# Patient Record
Sex: MALE | Race: OTHER | URBAN - METROPOLITAN AREA
[De-identification: names, ages, dates, MRNs, and addresses within clinical notes are randomized per-mention and may not be internally consistent; named-entity substitution may affect disease eponyms.]

---

## 2017-08-23 ENCOUNTER — INPATIENT (INPATIENT)
Facility: HOSPITAL | Age: 47
LOS: 1 days | Discharge: HOME | DRG: 358 | End: 2017-08-25
Attending: SURGERY | Admitting: SURGERY
Payer: COMMERCIAL

## 2017-08-23 ENCOUNTER — TRANSCRIPTION ENCOUNTER (OUTPATIENT)
Age: 47
End: 2017-08-23

## 2017-08-23 VITALS
TEMPERATURE: 98 F | HEART RATE: 77 BPM | RESPIRATION RATE: 20 BRPM | DIASTOLIC BLOOD PRESSURE: 82 MMHG | HEIGHT: 66 IN | OXYGEN SATURATION: 100 % | WEIGHT: 265 LBS | SYSTOLIC BLOOD PRESSURE: 123 MMHG

## 2017-08-23 DIAGNOSIS — K56.69 OTHER INTESTINAL OBSTRUCTION: ICD-10-CM

## 2017-08-23 LAB
ALBUMIN SERPL ELPH-MCNC: 5 G/DL — SIGNIFICANT CHANGE UP (ref 3.3–5.2)
ALP SERPL-CCNC: 72 U/L — SIGNIFICANT CHANGE UP (ref 40–120)
ALT FLD-CCNC: 42 U/L — HIGH
ANION GAP SERPL CALC-SCNC: 14 MMOL/L — SIGNIFICANT CHANGE UP (ref 5–17)
APPEARANCE UR: ABNORMAL
AST SERPL-CCNC: 26 U/L — SIGNIFICANT CHANGE UP
BACTERIA # UR AUTO: ABNORMAL
BASOPHILS # BLD AUTO: 0 K/UL — SIGNIFICANT CHANGE UP (ref 0–0.2)
BASOPHILS NFR BLD AUTO: 0.1 % — SIGNIFICANT CHANGE UP (ref 0–2)
BILIRUB SERPL-MCNC: 0.5 MG/DL — SIGNIFICANT CHANGE UP (ref 0.4–2)
BILIRUB UR-MCNC: NEGATIVE — SIGNIFICANT CHANGE UP
BUN SERPL-MCNC: 22 MG/DL — HIGH (ref 8–20)
CALCIUM SERPL-MCNC: 9.6 MG/DL — SIGNIFICANT CHANGE UP (ref 8.6–10.2)
CHLORIDE SERPL-SCNC: 101 MMOL/L — SIGNIFICANT CHANGE UP (ref 98–107)
CO2 SERPL-SCNC: 25 MMOL/L — SIGNIFICANT CHANGE UP (ref 22–29)
COLOR SPEC: YELLOW — SIGNIFICANT CHANGE UP
COMMENT - URINE: SIGNIFICANT CHANGE UP
CREAT SERPL-MCNC: 1 MG/DL — SIGNIFICANT CHANGE UP (ref 0.5–1.3)
DIFF PNL FLD: ABNORMAL
EOSINOPHIL # BLD AUTO: 0.2 K/UL — SIGNIFICANT CHANGE UP (ref 0–0.5)
EOSINOPHIL NFR BLD AUTO: 1.3 % — SIGNIFICANT CHANGE UP (ref 0–5)
GLUCOSE SERPL-MCNC: 125 MG/DL — HIGH (ref 70–115)
GLUCOSE UR QL: NEGATIVE MG/DL — SIGNIFICANT CHANGE UP
HCT VFR BLD CALC: 44.9 % — SIGNIFICANT CHANGE UP (ref 42–52)
HGB BLD-MCNC: 15.8 G/DL — SIGNIFICANT CHANGE UP (ref 14–18)
KETONES UR-MCNC: NEGATIVE — SIGNIFICANT CHANGE UP
LEUKOCYTE ESTERASE UR-ACNC: ABNORMAL
LIDOCAIN IGE QN: 19 U/L — LOW (ref 22–51)
LYMPHOCYTES # BLD AUTO: 2.8 K/UL — SIGNIFICANT CHANGE UP (ref 1–4.8)
LYMPHOCYTES # BLD AUTO: 20.9 % — SIGNIFICANT CHANGE UP (ref 20–55)
MCHC RBC-ENTMCNC: 28.2 PG — SIGNIFICANT CHANGE UP (ref 27–31)
MCHC RBC-ENTMCNC: 35.2 G/DL — SIGNIFICANT CHANGE UP (ref 32–36)
MCV RBC AUTO: 80 FL — SIGNIFICANT CHANGE UP (ref 80–94)
MONOCYTES # BLD AUTO: 1.1 K/UL — HIGH (ref 0–0.8)
MONOCYTES NFR BLD AUTO: 7.8 % — SIGNIFICANT CHANGE UP (ref 3–10)
NEUTROPHILS # BLD AUTO: 9.5 K/UL — HIGH (ref 1.8–8)
NEUTROPHILS NFR BLD AUTO: 69.5 % — SIGNIFICANT CHANGE UP (ref 37–73)
NITRITE UR-MCNC: NEGATIVE — SIGNIFICANT CHANGE UP
PH UR: 5 — SIGNIFICANT CHANGE UP (ref 5–8)
PLATELET # BLD AUTO: 288 K/UL — SIGNIFICANT CHANGE UP (ref 150–400)
POTASSIUM SERPL-MCNC: 3.8 MMOL/L — SIGNIFICANT CHANGE UP (ref 3.5–5.3)
POTASSIUM SERPL-SCNC: 3.8 MMOL/L — SIGNIFICANT CHANGE UP (ref 3.5–5.3)
PROT SERPL-MCNC: 8.3 G/DL — SIGNIFICANT CHANGE UP (ref 6.6–8.7)
PROT UR-MCNC: NEGATIVE MG/DL — SIGNIFICANT CHANGE UP
RBC # BLD: 5.61 M/UL — SIGNIFICANT CHANGE UP (ref 4.6–6.2)
RBC # FLD: 13.9 % — SIGNIFICANT CHANGE UP (ref 11–15.6)
SODIUM SERPL-SCNC: 140 MMOL/L — SIGNIFICANT CHANGE UP (ref 135–145)
SP GR SPEC: 1.02 — SIGNIFICANT CHANGE UP (ref 1.01–1.02)
UROBILINOGEN FLD QL: NEGATIVE MG/DL — SIGNIFICANT CHANGE UP
WBC # BLD: 13.6 K/UL — HIGH (ref 4.8–10.8)
WBC # FLD AUTO: 13.6 K/UL — HIGH (ref 4.8–10.8)
WBC UR QL: SIGNIFICANT CHANGE UP

## 2017-08-23 PROCEDURE — 74176 CT ABD & PELVIS W/O CONTRAST: CPT | Mod: 26

## 2017-08-23 PROCEDURE — 49320 DIAG LAPARO SEPARATE PROC: CPT

## 2017-08-23 PROCEDURE — 99222 1ST HOSP IP/OBS MODERATE 55: CPT | Mod: 57

## 2017-08-23 PROCEDURE — 99285 EMERGENCY DEPT VISIT HI MDM: CPT

## 2017-08-23 RX ORDER — FENTANYL CITRATE 50 UG/ML
50 INJECTION INTRAVENOUS
Qty: 0 | Refills: 0 | Status: DISCONTINUED | OUTPATIENT
Start: 2017-08-23 | End: 2017-08-23

## 2017-08-23 RX ORDER — OXYCODONE AND ACETAMINOPHEN 5; 325 MG/1; MG/1
1 TABLET ORAL EVERY 6 HOURS
Qty: 0 | Refills: 0 | Status: DISCONTINUED | OUTPATIENT
Start: 2017-08-23 | End: 2017-08-25

## 2017-08-23 RX ORDER — ENOXAPARIN SODIUM 100 MG/ML
40 INJECTION SUBCUTANEOUS DAILY
Qty: 0 | Refills: 0 | Status: DISCONTINUED | OUTPATIENT
Start: 2017-08-23 | End: 2017-08-25

## 2017-08-23 RX ORDER — SODIUM CHLORIDE 9 MG/ML
1000 INJECTION, SOLUTION INTRAVENOUS
Qty: 0 | Refills: 0 | Status: DISCONTINUED | OUTPATIENT
Start: 2017-08-23 | End: 2017-08-25

## 2017-08-23 RX ORDER — MORPHINE SULFATE 50 MG/1
2 CAPSULE, EXTENDED RELEASE ORAL EVERY 4 HOURS
Qty: 0 | Refills: 0 | Status: DISCONTINUED | OUTPATIENT
Start: 2017-08-23 | End: 2017-08-23

## 2017-08-23 RX ORDER — SODIUM CHLORIDE 9 MG/ML
1000 INJECTION INTRAMUSCULAR; INTRAVENOUS; SUBCUTANEOUS ONCE
Qty: 0 | Refills: 0 | Status: COMPLETED | OUTPATIENT
Start: 2017-08-23 | End: 2017-08-23

## 2017-08-23 RX ORDER — KETOROLAC TROMETHAMINE 30 MG/ML
30 SYRINGE (ML) INJECTION ONCE
Qty: 0 | Refills: 0 | Status: DISCONTINUED | OUTPATIENT
Start: 2017-08-23 | End: 2017-08-23

## 2017-08-23 RX ORDER — OXYCODONE AND ACETAMINOPHEN 5; 325 MG/1; MG/1
2 TABLET ORAL EVERY 6 HOURS
Qty: 0 | Refills: 0 | Status: DISCONTINUED | OUTPATIENT
Start: 2017-08-23 | End: 2017-08-25

## 2017-08-23 RX ORDER — MORPHINE SULFATE 50 MG/1
4 CAPSULE, EXTENDED RELEASE ORAL ONCE
Qty: 0 | Refills: 0 | Status: DISCONTINUED | OUTPATIENT
Start: 2017-08-23 | End: 2017-08-23

## 2017-08-23 RX ORDER — SODIUM CHLORIDE 9 MG/ML
1000 INJECTION INTRAMUSCULAR; INTRAVENOUS; SUBCUTANEOUS
Qty: 0 | Refills: 0 | Status: DISCONTINUED | OUTPATIENT
Start: 2017-08-23 | End: 2017-08-23

## 2017-08-23 RX ORDER — SODIUM CHLORIDE 9 MG/ML
1000 INJECTION, SOLUTION INTRAVENOUS
Qty: 0 | Refills: 0 | Status: DISCONTINUED | OUTPATIENT
Start: 2017-08-23 | End: 2017-08-23

## 2017-08-23 RX ORDER — ONDANSETRON 8 MG/1
4 TABLET, FILM COATED ORAL ONCE
Qty: 0 | Refills: 0 | Status: DISCONTINUED | OUTPATIENT
Start: 2017-08-23 | End: 2017-08-23

## 2017-08-23 RX ORDER — ONDANSETRON 8 MG/1
4 TABLET, FILM COATED ORAL ONCE
Qty: 0 | Refills: 0 | Status: COMPLETED | OUTPATIENT
Start: 2017-08-23 | End: 2017-08-23

## 2017-08-23 RX ADMIN — Medication 30 MILLIGRAM(S): at 15:37

## 2017-08-23 RX ADMIN — MORPHINE SULFATE 4 MILLIGRAM(S): 50 CAPSULE, EXTENDED RELEASE ORAL at 13:35

## 2017-08-23 RX ADMIN — ONDANSETRON 4 MILLIGRAM(S): 8 TABLET, FILM COATED ORAL at 12:10

## 2017-08-23 RX ADMIN — OXYCODONE AND ACETAMINOPHEN 1 TABLET(S): 5; 325 TABLET ORAL at 23:49

## 2017-08-23 RX ADMIN — SODIUM CHLORIDE 1000 MILLILITER(S): 9 INJECTION INTRAMUSCULAR; INTRAVENOUS; SUBCUTANEOUS at 13:36

## 2017-08-23 RX ADMIN — Medication 30 MILLIGRAM(S): at 12:10

## 2017-08-23 RX ADMIN — SODIUM CHLORIDE 100 MILLILITER(S): 9 INJECTION, SOLUTION INTRAVENOUS at 22:51

## 2017-08-23 RX ADMIN — OXYCODONE AND ACETAMINOPHEN 1 TABLET(S): 5; 325 TABLET ORAL at 22:49

## 2017-08-23 NOTE — ED ADULT NURSE REASSESSMENT NOTE - NS ED NURSE REASSESS COMMENT FT1
assumed care of pt from Rn in ED. pt resting comfortably in cart. breathing si even and unlabored will continue to monitor and reassess. fluids infusing well.

## 2017-08-23 NOTE — H&P ADULT - NSHPPHYSICALEXAM_GEN_ALL_CORE
General: AAOx3, NAD  Respiratory: CTA B/L  CV: RRR, S1S2, no murmurs, rubs or gallops  Abdominal: Soft, ND, diffuse TTP, no rebound tenderness  Extremities: No edema, + peripheral pulses  Vascular: +2 pulses all throughout   Neurology: grossly intact   Skin: intact

## 2017-08-23 NOTE — H&P ADULT - ATTENDING COMMENTS
46 yo male w/ no pmhx/psxhx presents to the Ed w/ 1 day h/o abdominal pain.  States sudden onset of dull, diffuse abdominal pain that started after a meal.  Denies N/V.  Last BM the day prior to presentation.  Cannot recall last flatus.  Denies fever/chills.  Pain persisted through the night and morning which prompted visit to the ED.  Afebrile.  HD normal.  WBC: 13.6; PMNs: 69%.  Abdomen: soft, ND, generalized TTP but no rebound, no guarding.  CT A/P shows dilated loops of proximal small bowel and collapsed distal SB c/w SBO w/ possible transition point in RLQ; edema of some jejunal loops.  Given patient has never had prior surgery, have recommended diagnostic laparoscopy.  Admit.  NPO/IVF.  Plan for OR for diagnostic laparoscopy.

## 2017-08-23 NOTE — ED STATDOCS - CHPI ED SYMPTOM NEG
no nausea/no hematuria/no dysuria/no fever/no blood in stool/no diarrhea/no vomiting/no burning urination

## 2017-08-23 NOTE — ED STATDOCS - PROGRESS NOTE DETAILS
PA NOTE: Pt seen by intake physician and HPI/ROS/PE/MDM reviewed. Pt seen and evaluated. Discussed plan and any resulted studies at this time. Will continue to monitor and re-evaluate. Abd: diffusely ttp, NO CVA tenderness Plan: as per intake. Surgery called Sx to admit Surgery called to see pt

## 2017-08-23 NOTE — ED STATDOCS - OBJECTIVE STATEMENT
46 y/o M pt with no PMHx presents to the ED c/o lower abdominal pain and chills x1 day. Pain is worsened on sitting down. Pain does not radiate and describe the pain as sharp. Denies n/v/d, hematuria, back pain, fevers or any other complaints. NKDA.     No Hx of renal stones.

## 2017-08-23 NOTE — H&P ADULT - ASSESSMENT
48 yo M with no PMHX/PSHx now with SBO  -Admit to [facility]  -NPO/IVFs  -Pain control  -Will go to the OR today

## 2017-08-23 NOTE — ED STATDOCS - MEDICAL DECISION MAKING DETAILS
Will obtain CT scan AP with no CO, labs, give pain medication and re-evaluate. CT with SBO. pt hemodynamically stable and pain controlled. Surgery to admit

## 2017-08-23 NOTE — H&P ADULT - HISTORY OF PRESENT ILLNESS
48 yo M with no PMHx/PSHx comes to the ED c/o diffuse abdominal pain since yesterday night. Pain is dull, diffuse, not well localized, 7/10, constant, with no alleviating factors. Denies having similar episodes in the past. Denies N/V, diarrhea. Last BM was yesterday night. Denies sick contacts or recent travel. Denies fevers, chills, SOB, chest pain or any others symptom

## 2017-08-23 NOTE — ED ADULT NURSE REASSESSMENT NOTE - NS ED NURSE REASSESS COMMENT FT1
transporter to ED to transport pt to OR. pt resting comfortably in cart. breathing si even and unlabored. NPO since last night. will NAD VSS

## 2017-08-23 NOTE — BRIEF OPERATIVE NOTE - OPERATION/FINDINGS
the bowel run through the ligament of Treitz, no signs of bowel ischemia or necrosis. No adhesive bands or masses were noted.

## 2017-08-23 NOTE — ED STATDOCS - NS_ ATTENDINGSCRIBEDETAILS _ED_A_ED_FT
I, Lenny Hong, performed the initial face to face bedside interview with this patient regarding history of present illness, review of symptoms and relevant past medical, social and family history.  I completed an independent physical examination.  I was the initial provider who evaluated this patient. I have signed out the follow up of any pending tests (i.e. labs, radiological studies) to the ACP.  I have communicated the patient’s plan of care and disposition with the ACP.

## 2017-08-24 RX ADMIN — SODIUM CHLORIDE 100 MILLILITER(S): 9 INJECTION, SOLUTION INTRAVENOUS at 01:43

## 2017-08-24 RX ADMIN — ENOXAPARIN SODIUM 40 MILLIGRAM(S): 100 INJECTION SUBCUTANEOUS at 11:13

## 2017-08-25 ENCOUNTER — TRANSCRIPTION ENCOUNTER (OUTPATIENT)
Age: 47
End: 2017-08-25

## 2017-08-25 VITALS
TEMPERATURE: 99 F | OXYGEN SATURATION: 99 % | RESPIRATION RATE: 18 BRPM | HEART RATE: 76 BPM | DIASTOLIC BLOOD PRESSURE: 75 MMHG | SYSTOLIC BLOOD PRESSURE: 127 MMHG

## 2017-08-25 PROCEDURE — 99285 EMERGENCY DEPT VISIT HI MDM: CPT | Mod: 25

## 2017-08-25 PROCEDURE — 83605 ASSAY OF LACTIC ACID: CPT

## 2017-08-25 PROCEDURE — 36415 COLL VENOUS BLD VENIPUNCTURE: CPT

## 2017-08-25 PROCEDURE — 81001 URINALYSIS AUTO W/SCOPE: CPT

## 2017-08-25 PROCEDURE — 84100 ASSAY OF PHOSPHORUS: CPT

## 2017-08-25 PROCEDURE — 96374 THER/PROPH/DIAG INJ IV PUSH: CPT

## 2017-08-25 PROCEDURE — 80053 COMPREHEN METABOLIC PANEL: CPT

## 2017-08-25 PROCEDURE — 96375 TX/PRO/DX INJ NEW DRUG ADDON: CPT

## 2017-08-25 PROCEDURE — 74176 CT ABD & PELVIS W/O CONTRAST: CPT

## 2017-08-25 PROCEDURE — 83735 ASSAY OF MAGNESIUM: CPT

## 2017-08-25 PROCEDURE — 85027 COMPLETE CBC AUTOMATED: CPT

## 2017-08-25 PROCEDURE — 83690 ASSAY OF LIPASE: CPT

## 2017-08-25 PROCEDURE — 80048 BASIC METABOLIC PNL TOTAL CA: CPT

## 2017-08-25 RX ORDER — LANOLIN ALCOHOL/MO/W.PET/CERES
3 CREAM (GRAM) TOPICAL ONCE
Qty: 0 | Refills: 0 | Status: COMPLETED | OUTPATIENT
Start: 2017-08-25 | End: 2017-08-25

## 2017-08-25 RX ORDER — OXYCODONE HYDROCHLORIDE 5 MG/1
1 TABLET ORAL
Qty: 0 | Refills: 0 | COMMUNITY
Start: 2017-08-25

## 2017-08-25 RX ORDER — DOCUSATE SODIUM 100 MG
100 CAPSULE ORAL THREE TIMES A DAY
Qty: 0 | Refills: 0 | Status: DISCONTINUED | OUTPATIENT
Start: 2017-08-25 | End: 2017-08-25

## 2017-08-25 RX ORDER — SENNA PLUS 8.6 MG/1
2 TABLET ORAL AT BEDTIME
Qty: 0 | Refills: 0 | Status: DISCONTINUED | OUTPATIENT
Start: 2017-08-25 | End: 2017-08-25

## 2017-08-25 RX ADMIN — Medication 3 MILLIGRAM(S): at 00:30

## 2017-08-25 RX ADMIN — Medication 100 MILLIGRAM(S): at 06:16

## 2017-08-25 RX ADMIN — SODIUM CHLORIDE 100 MILLILITER(S): 9 INJECTION, SOLUTION INTRAVENOUS at 06:16

## 2017-08-25 RX ADMIN — ENOXAPARIN SODIUM 40 MILLIGRAM(S): 100 INJECTION SUBCUTANEOUS at 12:09

## 2017-08-25 NOTE — DISCHARGE NOTE ADULT - PLAN OF CARE
Be pain free and return to normal activities Follow up: Please call and make an appointment with Acute Care Surgery one week after discharge. Call for an appointment following discharge Also, please call and make an appointment with your primary care physician as per your usual schedule.   Activity: [May return to normal activities as tolerated], [Please, limit activity and rest until follow up appointment].   Diet: May continue [] diet.  Medications: Please take all home medications as prescribed by your primary care doctor. Pain medication has been prescribed for you. Please, take it as it has been prescribed, do not drive or operate heavy machinery while taking narcotics.   Wound Care: Please, keep wound site clean and dry. You may shower, but do not bathe. Steri strips on incision sites will fall of within a week.   If confusion, altered mental status, fever, chest pain, shortness of breath, new or worsening pain, vomiting, change or worsening of medical status, please come back to the emergency room, and in case of emergency call 911.

## 2017-08-25 NOTE — DISCHARGE NOTE ADULT - PATIENT PORTAL LINK FT
“You can access the [employer] Patient Portal, offered by [facility], by registering with the following website: [email or web address]”

## 2017-08-25 NOTE — DISCHARGE NOTE ADULT - HOSPITAL COURSE
46 yo M with no PMHx/PSHx comes to the ED c/o diffuse abdominal pain since the night prior to presentation. Pain was dull, diffuse, not well localized, 7/10, constant, with no alleviating factors. Denies having similar episodes in the past. Denies N/V, diarrhea. Last BM was previous night. Denies sick contacts or recent travel. Denies fevers, chills, SOB, chest pain or any others symptom. Initial imaging including CT Abdomen/Pelvis which showed a dilated proximal small bowel with a transition point in the right lower quadrant. Given patient has never had prior surgery, diagnostic laparoscopy was recommended. No signs of bowel ischemia or necrosis. No adhesive bands or masses were noted following diagnostic laparoscopy. Pt tolerated procedures with no complications. On Hospital day 1, abdominal pain improved, he was ambulating and patient tolerated clear liquid diet. Morning of hospital day, he reports voiding and bowel movement. No pain was reported and was tolerating a regular diet.

## 2017-08-25 NOTE — DISCHARGE NOTE ADULT - PROVIDER TOKENS
FREE:[LAST:[Acute Care Surgery],PHONE:[phone]],FAX:[(   )    -],ADDRESS:[[address] [place] [address], [address] [place] [address]]]

## 2017-08-25 NOTE — PROGRESS NOTE ADULT - SUBJECTIVE AND OBJECTIVE BOX
17  INTERVAL HPI/OVERNIGHT EVENTS:  Patient seen and examined, no acute events  POD#1 s/p diagnostic laparoscopy, no acute pathology  Feeling well  Pain controlled  Tolerating CLD  Afebrile, HD well      MEDICATIONS  (STANDING):  enoxaparin Injectable 40 milliGRAM(s) SubCutaneous daily  lactated ringers. 1000 milliLiter(s) (100 mL/Hr) IV Continuous <Continuous>  melatonin 3 milliGRAM(s) Oral once    MEDICATIONS  (PRN):  oxyCODONE    5 mG/acetaminophen 325 mG 1 Tablet(s) Oral every 6 hours PRN Moderate Pain (4 - 6)  oxyCODONE    5 mG/acetaminophen 325 mG 2 Tablet(s) Oral every 6 hours PRN Severe Pain (7 - 10)      Vital Signs Last 24 Hrs  T(C): 36.6 (24 Aug 2017 23:44), Max: 36.9 (24 Aug 2017 16:21)  T(F): 97.9 (24 Aug 2017 23:44), Max: 98.4 (24 Aug 2017 16:21)  HR: 81 (24 Aug 2017 23:44) (72 - 87)  BP: 110/66 (24 Aug 2017 23:44) (96/61 - 113/66)  BP(mean): --  RR: 19 (24 Aug 2017 23:44) (18 - 19)  SpO2: 98% (24 Aug 2017 23:44) (95% - 98%)    Physical Exam:    Neurological:  No sensory/motor deficits    HEENT: PERRLA, EOMI, no drainage or redness    Neck: No bruits; no thyromegaly or nodules,  No JVD    Back: Normal spine flexure, No CVA tenderness, No deformity or limitation of movement    Respiratory: Breath Sounds equal & clear to auscultation, no accessory muscle use    Cardiovascular: Regular rate & rhythm, normal S1, S2; no murmurs, gallops or rubs    Gastrointestinal: Soft, ND, NT, dressings c/d    Extremities: No peripheral edema, No cyanosis, clubbing     Vascular: Equal and normal pulses: 2+ peripheral pulses throughout    Musculoskeletal: No joint pain, swelling or deformity; no limitation of movement    Skin: No rashes      I&O's Detail    23 Aug 2017 07:01  -  24 Aug 2017 07:00  --------------------------------------------------------  IN:    lactated ringers.: 1200 mL  Total IN: 1200 mL    OUT:    Voided: 300 mL  Total OUT: 300 mL    Total NET: 900 mL      24 Aug 2017 07:01  -  25 Aug 2017 00:19  --------------------------------------------------------  IN:    lactated ringers.: 1200 mL  Total IN: 1200 mL    OUT:    Voided: 400 mL  Total OUT: 400 mL    Total NET: 800 mL          LABS:                        12.7   12.0  )-----------( 235      ( 24 Aug 2017 06:23 )             37.7     08-24    139  |  104  |  21.0<H>  ----------------------------<  133<H>  4.5   |  22.0  |  0.92    Ca    8.1<L>      24 Aug 2017 06:23  Phos  3.9     08-24  Mg     1.9     08-24    TPro  8.3  /  Alb  5.0  /  TBili  0.5  /  DBili  x   /  AST  26  /  ALT  42<H>  /  AlkPhos  72  08-23      Urinalysis Basic - ( 23 Aug 2017 13:32 )    Color: Yellow / Appearance: x / S.025 / pH: x  Gluc: x / Ketone: Negative  / Bili: Negative / Urobili: Negative mg/dL   Blood: x / Protein: Negative mg/dL / Nitrite: Negative   Leuk Esterase: Trace / RBC: x / WBC 0-2   Sq Epi: x / Non Sq Epi: x / Bacteria: x        RADIOLOGY & ADDITIONAL STUDIES:

## 2017-08-25 NOTE — DISCHARGE NOTE ADULT - CARE PROVIDER_API CALL
Acute Care Surgery,   [address] [facility], [address]st [place] [address]  Phone: [phone]  Fax: (   )    -

## 2017-08-25 NOTE — DISCHARGE NOTE ADULT - MEDICATION SUMMARY - MEDICATIONS TO TAKE
I will START or STAY ON the medications listed below when I get home from the hospital:     mg oral tablet  -- 1 tab(s) by mouth every 6 hours prn pain/swelling  -- Do not take this drug if you are pregnant.  It is very important that you take or use this exactly as directed.  Do not skip doses or discontinue unless directed by your doctor.  May cause drowsiness or dizziness.  Obtain medical advice before taking any non-prescription drugs as some may affect the action of this medication.  Take with food or milk.    -- Indication: For For Pain

## 2017-08-25 NOTE — PROGRESS NOTE ADULT - ASSESSMENT
48 yo M with no PMHX/PSHx admitted for  SBO, POD#1 diagnostic laparoscopy, no acute findings  Recovering well  -ADAT  -Pain control  -Start bowel regimen  -OOB/Ambulate  -DVT ppx

## 2017-08-25 NOTE — DISCHARGE NOTE ADULT - CARE PLAN
Principal Discharge DX:	Gastroenteritis  Goal:	Be pain free and return to normal activities  Instructions for follow-up, activity and diet:	Follow up: Please call and make an appointment with Acute Care Surgery one week after discharge. Call for an appointment following discharge Also, please call and make an appointment with your primary care physician as per your usual schedule.   Activity: [May return to normal activities as tolerated], [Please, limit activity and rest until follow up appointment].   Diet: May continue [] diet.  Medications: Please take all home medications as prescribed by your primary care doctor. Pain medication has been prescribed for you. Please, take it as it has been prescribed, do not drive or operate heavy machinery while taking narcotics.   Wound Care: Please, keep wound site clean and dry. You may shower, but do not bathe. Steri strips on incision sites will fall of within a week.   If confusion, altered mental status, fever, chest pain, shortness of breath, new or worsening pain, vomiting, change or worsening of medical status, please come back to the emergency room, and in case of emergency call 911.

## 2017-08-25 NOTE — PROGRESS NOTE ADULT - ATTENDING COMMENTS
The patient was seen and examined  No new problems  He tolerated his breakfast  Had a BM  Pain is controlled  Discharge home

## 2017-08-31 ENCOUNTER — APPOINTMENT (OUTPATIENT)
Dept: TRAUMA SURGERY | Facility: CLINIC | Age: 47
End: 2017-08-31
Payer: COMMERCIAL

## 2017-08-31 VITALS
RESPIRATION RATE: 16 BRPM | HEIGHT: 66 IN | OXYGEN SATURATION: 98 % | WEIGHT: 259 LBS | SYSTOLIC BLOOD PRESSURE: 124 MMHG | DIASTOLIC BLOOD PRESSURE: 86 MMHG | HEART RATE: 65 BPM | BODY MASS INDEX: 41.62 KG/M2 | TEMPERATURE: 98.1 F

## 2017-08-31 PROCEDURE — 99024 POSTOP FOLLOW-UP VISIT: CPT

## 2017-09-05 ENCOUNTER — APPOINTMENT (OUTPATIENT)
Dept: PULMONOLOGY | Facility: CLINIC | Age: 47
End: 2017-09-05
Payer: COMMERCIAL

## 2017-09-05 VITALS
SYSTOLIC BLOOD PRESSURE: 128 MMHG | DIASTOLIC BLOOD PRESSURE: 76 MMHG | HEART RATE: 104 BPM | OXYGEN SATURATION: 98 % | WEIGHT: 264 LBS | BODY MASS INDEX: 42.61 KG/M2

## 2017-09-05 VITALS — RESPIRATION RATE: 16 BRPM

## 2017-09-05 PROCEDURE — 99214 OFFICE O/P EST MOD 30 MIN: CPT

## 2017-09-08 ENCOUNTER — TRANSCRIPTION ENCOUNTER (OUTPATIENT)
Age: 47
End: 2017-09-08

## 2017-10-05 ENCOUNTER — APPOINTMENT (OUTPATIENT)
Dept: PULMONOLOGY | Facility: CLINIC | Age: 47
End: 2017-10-05
Payer: COMMERCIAL

## 2017-10-05 VITALS
OXYGEN SATURATION: 96 % | SYSTOLIC BLOOD PRESSURE: 120 MMHG | WEIGHT: 266 LBS | DIASTOLIC BLOOD PRESSURE: 70 MMHG | HEART RATE: 96 BPM | BODY MASS INDEX: 42.93 KG/M2

## 2017-10-05 VITALS — RESPIRATION RATE: 16 BRPM

## 2017-10-05 PROCEDURE — 99214 OFFICE O/P EST MOD 30 MIN: CPT

## 2017-11-20 ENCOUNTER — APPOINTMENT (OUTPATIENT)
Dept: PULMONOLOGY | Facility: CLINIC | Age: 47
End: 2017-11-20
Payer: COMMERCIAL

## 2017-11-20 VITALS — OXYGEN SATURATION: 98 % | HEART RATE: 87 BPM

## 2017-11-20 VITALS — BODY MASS INDEX: 44.71 KG/M2 | SYSTOLIC BLOOD PRESSURE: 146 MMHG | WEIGHT: 277 LBS | DIASTOLIC BLOOD PRESSURE: 80 MMHG

## 2017-11-20 VITALS — RESPIRATION RATE: 16 BRPM

## 2017-11-20 PROCEDURE — 99214 OFFICE O/P EST MOD 30 MIN: CPT

## 2018-05-22 ENCOUNTER — APPOINTMENT (OUTPATIENT)
Dept: PULMONOLOGY | Facility: CLINIC | Age: 48
End: 2018-05-22

## 2018-12-07 NOTE — ED STATDOCS - CHIEF COMPLAINT
66M with pmh of IDDM here with hypoglycemia. Took 7 units of Novolog this morning,  ate breakfast, found altered by his wife. FS very low by EMS, s/p dextrose and juice. Now back to baseline.  +cough. No fever, shortness of breath, chest pain, urinary symptoms. No recent changes in meds.  Only on insulin for DM. Pt took 11u levemir last night (regular dose).
The patient is a 47y year old Male complaining of abdominal pain.

## 2021-08-10 ENCOUNTER — APPOINTMENT (OUTPATIENT)
Dept: PULMONOLOGY | Facility: CLINIC | Age: 51
End: 2021-08-10
Payer: COMMERCIAL

## 2021-08-10 VITALS — RESPIRATION RATE: 16 BRPM | HEART RATE: 102 BPM | OXYGEN SATURATION: 96 %

## 2021-08-10 VITALS
SYSTOLIC BLOOD PRESSURE: 140 MMHG | DIASTOLIC BLOOD PRESSURE: 98 MMHG | BODY MASS INDEX: 50.62 KG/M2 | HEIGHT: 66 IN | WEIGHT: 315 LBS

## 2021-08-10 DIAGNOSIS — R05 COUGH: ICD-10-CM

## 2021-08-10 DIAGNOSIS — G47.33 OBSTRUCTIVE SLEEP APNEA (ADULT) (PEDIATRIC): ICD-10-CM

## 2021-08-10 DIAGNOSIS — K21.9 GASTRO-ESOPHAGEAL REFLUX DISEASE W/OUT ESOPHAGITIS: ICD-10-CM

## 2021-08-10 DIAGNOSIS — E66.01 MORBID (SEVERE) OBESITY DUE TO EXCESS CALORIES: ICD-10-CM

## 2021-08-10 PROCEDURE — G0447 BEHAVIOR COUNSEL OBESITY 15M: CPT

## 2021-08-10 PROCEDURE — 99204 OFFICE O/P NEW MOD 45 MIN: CPT | Mod: 25

## 2021-08-10 NOTE — COUNSELING
[Potential consequences of obesity discussed] : Potential consequences of obesity discussed [Benefits of weight loss discussed] : Benefits of weight loss discussed [Structured Weight Management Program suggested:] : Structured weight management program suggested [Encouraged to maintain food diary] : Encouraged to maintain food diary [Encouraged to increase physical activity] : Encouraged to increase physical activity [Encouraged to use exercise tracking device] : Encouraged to use exercise tracking device [FreeTextEntry1] : Kizzy [FreeTextEntry4] : 15

## 2021-08-10 NOTE — ASSESSMENT
[FreeTextEntry1] : Patient with a paroxysmal dry cough, also with obstructive sleep apnea that is not being treated currently. I suspect that he may have silent reflux leading to the cough which is being made worse by untreated sleep apnea.Sleep apnea was moderate 5 years ago but was 50 pounds of weight gain is probably more severe now. I started him on famotidine 20 mg daily and I told him he must start using his CPAP machine again nightly. I scheduled pulmonary function studies to rule out intrinsic pulmonary disease. I will see him back in 2 months to determine his response to therapy and review the pulmonary function studies.

## 2021-08-10 NOTE — PHYSICAL EXAM
[No Acute Distress] : no acute distress [Low Lying Soft Palate] : low lying soft palate [Elongated Uvula] : elongated uvula [Enlarged Base of the Tongue] : enlarged base of the tongue [III] : Mallampati Class: III [Normal Appearance] : normal appearance [Neck Circumference: ___] : neck circumference: [unfilled] [No Neck Mass] : no neck mass [Normal Rate/Rhythm] : normal rate/rhythm [Normal S1, S2] : normal s1, s2 [No Murmurs] : no murmurs [No Resp Distress] : no resp distress [Clear to Auscultation Bilaterally] : clear to auscultation bilaterally [No Abnormalities] : no abnormalities [Benign] : benign [Normal Gait] : normal gait [No Clubbing] : no clubbing [No Cyanosis] : no cyanosis [No Edema] : no edema [FROM] : FROM [Normal Color/ Pigmentation] : normal color/ pigmentation [No Focal Deficits] : no focal deficits [Oriented x3] : oriented x3 [Normal Affect] : normal affect [TextBox_2] : Morbidly Obese

## 2021-08-10 NOTE — HISTORY OF PRESENT ILLNESS
[TextBox_4] : Patient has had a cough for several months. It is dry and occurs throughout the day but sometimes wakes him up at night. He has had paroxysms of coughing so hard that he was passed out. He was seen by cardiology and workup was largely unremarkable. He reports some mild dyspnea on exertion. He was a nonsmoker. Has been no hemoptysis or chest pain.\par \par CT scan of the chest was done 2 weeks ago which showed some dependent atelectatic changes but was otherwise unremarkable. Echocardiogram showed some LVH but without pulmonary hypertension are decreased ejection fraction.\par \par The patient was last seen in this office about 4 years ago. In 2016 he had a home sleep study showing moderate obstructive sleep apnea with AHI of 22 with significant desaturations. He was placed on auto Pap. He has been poorly compliant with auto Pap over the past 4-5 months. His last use was 4 months ago. I was able to look at compliance data from just prior to that and his current settings are correct. The patient has gained about 50 pounds since we first diagnosed him with sleep apnea.

## 2021-08-10 NOTE — CONSULT LETTER
[Dear  ___] : Dear  [unfilled], [Consult Letter:] : I had the pleasure of evaluating your patient, [unfilled]. [Please see my note below.] : Please see my note below. [Consult Closing:] : Thank you very much for allowing me to participate in the care of this patient.  If you have any questions, please do not hesitate to contact me. [Sincerely,] : Sincerely, [FreeTextEntry3] : Cat Murillo MD FCCP\par D-ABSM\par ABIM board certified in  Pulmonary diseases, Sleep medicine\par Internal medicine\par  [DrWilton  ___] : Dr. PALMA

## 2021-09-07 ENCOUNTER — RX CHANGE (OUTPATIENT)
Age: 51
End: 2021-09-07

## 2021-09-09 ENCOUNTER — RX CHANGE (OUTPATIENT)
Age: 51
End: 2021-09-09

## 2021-09-09 RX ORDER — FAMOTIDINE 20 MG/1
20 TABLET, FILM COATED ORAL
Qty: 90 | Refills: 3 | Status: ACTIVE | COMMUNITY
Start: 2021-08-10 | End: 1900-01-01

## 2021-10-02 ENCOUNTER — APPOINTMENT (OUTPATIENT)
Dept: DISASTER EMERGENCY | Facility: CLINIC | Age: 51
End: 2021-10-02

## 2021-10-06 ENCOUNTER — APPOINTMENT (OUTPATIENT)
Dept: PULMONOLOGY | Facility: CLINIC | Age: 51
End: 2021-10-06
